# Patient Record
Sex: FEMALE | Employment: FULL TIME | URBAN - METROPOLITAN AREA
[De-identification: names, ages, dates, MRNs, and addresses within clinical notes are randomized per-mention and may not be internally consistent; named-entity substitution may affect disease eponyms.]

---

## 2017-02-10 DIAGNOSIS — Z00.00 HEALTHCARE MAINTENANCE: ICD-10-CM

## 2017-02-10 DIAGNOSIS — E55.9 HYPOVITAMINOSIS D: Primary | ICD-10-CM

## 2017-02-10 RX ORDER — CHOLECALCIFEROL (VITAMIN D3) 50 MCG
2000 TABLET ORAL DAILY
Qty: 100 TABLET | Refills: 3 | Status: SHIPPED
Start: 2017-02-10 | End: 2018-05-01

## 2017-02-10 NOTE — TELEPHONE ENCOUNTER
Request for medication refill:    Date of last visit at clinic: 8-23-16    Please complete refill if appropriate and CLOSE ENCOUNTER.    Closing the encounter signifies the refill is complete.    If refill has been denied, please complete the smart phrase .smirefuse and route it to the [facility] RN TRIAGE pool to inform the patient and the pharmacy.    Elen Alvarez

## 2017-07-07 ENCOUNTER — OFFICE VISIT (OUTPATIENT)
Dept: FAMILY MEDICINE | Facility: CLINIC | Age: 47
End: 2017-07-07

## 2017-07-07 VITALS
SYSTOLIC BLOOD PRESSURE: 113 MMHG | OXYGEN SATURATION: 98 % | DIASTOLIC BLOOD PRESSURE: 77 MMHG | RESPIRATION RATE: 16 BRPM | BODY MASS INDEX: 25.4 KG/M2 | WEIGHT: 156.2 LBS | HEART RATE: 64 BPM | TEMPERATURE: 98.1 F

## 2017-07-07 DIAGNOSIS — Z31.9 DESIRE FOR PREGNANCY: Primary | ICD-10-CM

## 2017-07-07 DIAGNOSIS — Z00.00 ROUTINE GENERAL MEDICAL EXAMINATION AT A HEALTH CARE FACILITY: ICD-10-CM

## 2017-07-07 LAB
ALBUMIN SERPL-MCNC: 4.1 MG/DL (ref 3.8–5)
ALP SERPL-CCNC: 58.3 U/L (ref 31.7–110.5)
ALT SERPL-CCNC: 12.2 U/L (ref 0–45)
AST SERPL-CCNC: 14.4 U/L (ref 0–45)
BILIRUB SERPL-MCNC: <0.4 MG/DL (ref 0.2–1.3)
BUN SERPL-MCNC: 8.8 MG/DL (ref 7–19)
CALCIUM SERPL-MCNC: 9.4 MG/DL (ref 8.5–10.1)
CHLORIDE SERPLBLD-SCNC: 101.8 MMOL/L (ref 98–110)
CO2 SERPL-SCNC: 22.3 MMOL/L (ref 20–32)
CREAT SERPL-MCNC: 0.7 MG/DL (ref 0.5–1)
GFR SERPL CREATININE-BSD FRML MDRD: >90 ML/MIN/1.7 M2
GLUCOSE SERPL-MCNC: 134 MG'DL (ref 70–99)
HCT VFR BLD AUTO: 39.7 % (ref 35–47)
HEMOGLOBIN: 12.8 G/DL (ref 11.7–15.7)
MCH RBC QN AUTO: 32.2 PG (ref 26.5–35)
MCHC RBC AUTO-ENTMCNC: 32.2 G/DL (ref 32–36)
MCV RBC AUTO: 99.9 FL (ref 78–100)
PLATELET # BLD AUTO: 162 K/UL (ref 150–450)
POTASSIUM SERPL-SCNC: 3.6 MMOL/DL (ref 3.3–4.5)
PROT SERPL-MCNC: 6.9 G/DL (ref 6.8–8.8)
RBC # BLD AUTO: 3.97 M/UL (ref 3.8–5.2)
SODIUM SERPL-SCNC: 133.4 MMOL/L (ref 132.6–141.4)
WBC # BLD AUTO: 4.9 K/UL (ref 4–11)

## 2017-07-07 RX ORDER — PRENATAL VIT/IRON FUM/FOLIC AC 27MG-0.8MG
1 TABLET ORAL DAILY
Qty: 100 TABLET | Refills: 3 | COMMUNITY
Start: 2017-07-07 | End: 2018-05-01

## 2017-07-07 NOTE — PROGRESS NOTES
Preceptor Attestation:   Patient seen and discussed with the resident. Assessment and plan reviewed with resident and agreed upon.   Supervising Physician:  Edmond Jeffery MD  [facility]

## 2017-07-07 NOTE — MR AVS SNAPSHOT
After Visit Summary   2017    Theresa Bowers    MRN: [id number]           Patient Information     Date Of Birth          1970        Visit Information        Provider Department      2017 2:20 PM Ashley Tyler MD [facility]        Today's Diagnoses     Desire for pregnancy    -  1       Follow-ups after your visit        Who to contact     Please call your clinic at [phone] to:    Ask questions about your health    Make or cancel appointments    Discuss your medicines    Learn about your test results    Speak to your doctor   If you have compliments or concerns about an experience at your clinic, or if you wish to file a complaint, please contact [facility] Patient Relations at [phone] or email us at [email or web address]         Additional Information About Your Visit        MyChart Information     GeoPay is an electronic gateway that provides easy, online access to your medical records. With GeoPay, you can request a clinic appointment, read your test results, renew a prescription or communicate with your care team.     To sign up for Voter Gravityt visit the website at [email or web address]   You will be asked to enter the access code listed below, as well as some personal information. Please follow the directions to create your username and password.     Your access code is: [email or web address]  Expires: 10/5/2017  3:08 PM     Your access code will  in 90 days. If you need help or a new code, please contact your [facility] or call [phone] for assistance.        Care EveryWhere ID     This is your Care EveryWhere ID. This could be used by other organizations to access your [facility] medical records  [id number]        Your Vitals Were     Pulse Temperature Respirations Pulse Oximetry BMI (Body Mass Index)       64 98.1  F (36.7  C) (Oral) 16 98% 25.4 kg/m2        Blood Pressure  from Last 3 Encounters:   07/07/17 113/77   08/23/16 118/81   10/15/15 114/72    Weight from Last 3 Encounters:   07/07/17 156 lb 3.2 oz (70.9 kg)   08/23/16 156 lb 12.8 oz (71.1 kg)   10/15/15 151 lb (68.5 kg)              We Performed the Following     CBC with Plt (LabDAQ)     Comprehensive Metabolic Panel (LabDAQ)     TSH with free T4 reflex     Vitamin D Level        Primary Care Provider Office Phone # Fax #    Ashley Tyler [phone]       [facility] [address] [facility] [address]        Equal Access to Services     SAVANNA ELLISON : Yaneth Chilel, dana cardona, akash ruano, giuliana kevin. So [facility] [phone].    ATENCIÓN: Si habla español, tiene a blanchard disposición servicios gratuitos de asistencia lingüística. Llame al [phone].    We comply with applicable federal civil rights laws and [place] laws. We do not discriminate on the basis of race, color, national origin, age, disability sex, sexual orientation or gender identity.            Thank you!     Thank you for choosing [facility]  for your care. Our goal is always to provide you with excellent care. Hearing back from our patients is one way we can continue to improve our services. Please take a few minutes to complete the written survey that you may receive in the mail after your visit with us. Thank you!             Your Updated Medication List - Protect others around you: Learn how to safely use, store and throw away your medicines at www.disposemymeds.org.          This list is accurate as of: 7/7/17  3:08 PM.  Always use your most recent med list.                   Brand Name Dispense Instructions for use Diagnosis    calcium carbonate-vitamin D 500-400 MG-UNIT Tabs per tablet     180 tablet    Take 1 tablet by mouth 2 times daily    Healthcare maintenance       guaiFENesin-dextromethorphan 100-10 MG/5ML syrup     ROBITUSSIN DM    560 mL    Take 5 mLs by mouth every 4 hours as needed for cough    URI (upper respiratory infection)       ibuprofen 600 MG tablet    ADVIL/MOTRIN    60 tablet    Take 1 tablet (600 mg) by mouth every 6 hours as needed for pain    Mechanical low back pain       loratadine 10 MG tablet    CLARITIN    30 tablet    Take 1 tablet (10 mg) by mouth daily    Seasonal allergies       prenatal multivitamin  plus iron 27-0.8 MG Tabs per tablet     100 tablet    Take 1 tablet by mouth daily    Desire for pregnancy       vitamin D 2000 UNITS tablet     100 tablet    Take 2,000 Units by mouth daily    Hypovitaminosis D

## 2017-07-07 NOTE — LETTER
July 10, 2017      Theresa Bowers  [address]   [place] [address]        Dear Theresa,    Thank you for getting your care at [facility]. Please see below for your test results. Your thyroid hormone is slightly elevated this time so I would like to retest in 3 months. Please call the clinic and schedule an appointment.     Resulted Orders   CBC with Plt (LabDAQ)   Result Value Ref Range    WBC 4.9 4.0 - 11.0 K/uL    RBC 3.97 3.80 - 5.20 M/uL    Hemoglobin 12.8 11.7 - 15.7 g/dL    Hematocrit 39.7 35.0 - 47.0 %    MCV 99.9 78.0 - 100.0 fL    MCH 32.2 26.5 - 35.0 pg    MCHC 32.2 32.0 - 36.0 g/dL    Platelets 162.0 150.0 - 450.0 K/uL   Comprehensive Metabolic Panel (LabDAQ)   Result Value Ref Range    Albumin 4.1 3.8 - 5.0 mg/dL    Alkaline Phosphatase 58.3 31.7 - 110.5 U/L    ALT 12.2 0.0 - 45.0 U/L    AST 14.4 0.0 - 45.0 U/L    Bilirubin Total <0.4 0.2 - 1.3 mg/dL    Urea Nitrogen 8.8 7.0 - 19.0 mg/dL    Calcium 9.4 8.5 - 10.1 mg/dL    Chloride 101.8 98.0 - 110.0 mmol/L    Carbon Dioxide 22.3 20.0 - 32.0 mmol/L    Creatinine 0.7 0.5 - 1.0 mg/dL    Glucose 134.0 (H) 70.0 - 99.0 mg'dL    Potassium 3.6 3.3 - 4.5 mmol/dL    Sodium 133.4 132.6 - 141.4 mmol/L    Protein Total 6.9 6.8 - 8.8 g/dL    GFR Estimate >90 >60.0 mL/min/1.7 m2    GFR Estimate If Black >90 >60.0 mL/min/1.7 m2   Vitamin D Level   Result Value Ref Range    Vitamin D Deficiency screening 37 20 - 75 ug/L      Comment:      Season, race, dietary intake, and treatment affect the concentration of   25-hydroxy-Vitamin D. Values may decrease during winter months and increase   during summer months. Values 20-29 ug/L may indicate Vitamin D insufficiency   and values <20 ug/L may indicate Vitamin D deficiency.   Vitamin D determination is routinely performed by an immunoassay specific for   25 hydroxyvitamin D3.  If an individual is on vitamin D2 (ergocalciferol)   supplementation, please specify 25 OH vitamin D2 and D3 level determination   by    LCMSMS test VITD23.     TSH with free T4 reflex   Result Value Ref Range    TSH 4.72 (H) 0.40 - 4.00 mU/L   T4 free   Result Value Ref Range    T4 Free 0.85 0.76 - 1.46 ng/dL       If you have any concerns about these results please call and leave a message for me or send a MyChart message to the clinic.    Sincerely,    Ashley Tyler MD

## 2017-07-08 LAB — DEPRECATED CALCIDIOL+CALCIFEROL SERPL-MC: 37 UG/L (ref 20–75)

## 2017-07-09 LAB
T4 FREE SERPL-MCNC: 0.85 NG/DL (ref 0.76–1.46)
TSH SERPL DL<=0.005 MIU/L-ACNC: 4.72 MU/L (ref 0.4–4)

## 2017-07-12 PROBLEM — Z31.9 DESIRE FOR PREGNANCY: Status: ACTIVE | Noted: 2017-07-12

## 2017-07-13 NOTE — PROGRESS NOTES
Female Physical Note          HPI       Concerns today: No special concerns today.     Patient wants to check her overall health. She states that her real age is 41 and that she desires another child. She is planning to go to [facility] in August to meet her  and hopefully to get pregnant. She states that her menstrual period is regular at this point (h/o irregular menses in the past).      Patient Active Problem List   Diagnosis     PPD positive, treated - 9 mos INH completed, given by [facility] in 2004.     Age reporting       History reviewed. No pertinent past medical history.    Previous Medical Care   Here at [facility]     Family History   Problem Relation Age of Onset     GASTROINTESTINAL DISEASE Brother      GERD              Review of Systems:     Review of Systems:  CONSTITUTIONAL: NEGATIVE for fever, chills, change in weight  INTEGUMENTARY/SKIN: NEGATIVE for worrisome rashes, moles or lesions  EYES: NEGATIVE for vision changes or irritation  ENT/MOUTH: NEGATIVE for ear, mouth and throat problems  RESP: NEGATIVE for significant cough or SOB  BREAST: NEGATIVE for masses, tenderness or discharge  CV: NEGATIVE for chest pain, palpitations or peripheral edema  GI: NEGATIVE for nausea, abdominal pain, heartburn, or change in bowel habits  : NEGATIVE for frequency, dysuria, or hematuria  MUSCULOSKELETAL: NEGATIVE for significant arthralgias or myalgia  NEURO: NEGATIVE for weakness, dizziness or paresthesias  ENDOCRINE: NEGATIVE for temperature intolerance, skin/hair changes  HEME/ALLERGY: NEGATIVE for bleeding problems  PSYCHIATRIC: NEGATIVE for changes in mood or affect  Sleep:   Do you snore most or the night (as reported by a family member)? No  Do you feel sleepy or extremely tired during most of the day? No             Social History     Social History     Social History     Marital status: Single     Spouse name: N/A     Number of children: N/A     Years of education: N/A     Occupational History     Not  on file.     Social History Main Topics     Smoking status: Never Smoker     Smokeless tobacco: Not on file     Alcohol use No     Drug use: No     Sexual activity: Not on file     Other Topics Concern     Not on file     Social History Narrative       Marital Status:  Who lives in your household? With her sons.  lives in [place]    Has anyone hurt you physically, for example by pushing, hitting, slapping or kicking you or forcing you to have sex? Denies  Do you feel threatened or controlled by a partner, ex-partner or anyone in your life? Denies    Sexual Health     Sexual concerns: No   STI History: Neg  Pregnancy History: No obstetric history on file.  LMP No LMP recorded. Menses: q 28-31 days  Last Pap Smear Date:   Lab Results   Component Value Date    PAP NIL 09/26/2014    PAP NIL 10/18/2011     Abnormal Pap History: None    Recommended Screening     Pap/HPV cotest every 5 years for women 30-65   Recommended and patient accepted testing:  Co-test performed in 2014 so due for a CO-TEST in 2019.            Physical Exam:     Vitals: /77  Pulse 64  Temp 98.1  F (36.7  C) (Oral)  Resp 16  Wt 156 lb 3.2 oz (70.9 kg)  SpO2 98%  BMI 25.4 kg/m2  BMI= Body mass index is 25.4 kg/(m^2).   GENERAL: healthy, alert and no distress  EYES: Eyes grossly normal to inspection, extraocular movements - intact, and PERRL  HENT: ear canals- normal; TMs- normal; Nose- normal; Mouth- no ulcers, no lesions  NECK: no tenderness, no adenopathy, no asymmetry, no masses, no stiffness; thyroid- normal to palpation  RESP: lungs clear to auscultation - no rales, no rhonchi, no wheezes  BREAST: deferred  CV: regular rates and rhythm, normal S1 S2, no S3 or S4 and no murmur, no click or rub -  ABDOMEN: soft, no tenderness, no  hepatosplenomegaly, no masses, normal bowel sounds  MS: extremities- no gross deformities noted, no edema  SKIN: no suspicious lesions, no rashes  NEURO: strength and tone- normal, sensory exam-  grossly normal, mentation- intact, speech- normal, reflexes- symmetric  BACK: no CVA tenderness, no paralumbar tenderness  - female: deferred  PSYCH: Alert and oriented times 3; speech- coherent , normal rate and volume; able to articulate logical thoughts, able to abstract reason, no tangential thoughts, no hallucinations or delusions, affect- normal  LYMPHATICS: ant. cervical- normal, post. cervical- normal, axillary- normal, supraclavicular- normal, inguinal- normal      Assessment and Plan   Theresa was seen today for physical.    Diagnoses and all orders for this visit:    Desire for pregnancy  -     CBC with Plt (LabDAQ)  -     Comprehensive Metabolic Panel (LabDAQ)  -     Vitamin D Level  -     TSH with free T4 reflex  -     Discussed core temperature measurement for ovulation recognition. Also advised to buy an ovulation kit to better know ovulation days.     Follow up in 1 year for CPE.     Options for treatment and follow-up care were reviewed with the patient . Theresa Bowers and/or guardian engaged in the decision making process and verbalized understanding of the options discussed and agreed with the final plan.    Ashley Tyler MD

## 2018-05-01 DIAGNOSIS — Z31.9 DESIRE FOR PREGNANCY: ICD-10-CM

## 2018-05-01 DIAGNOSIS — Z00.00 HEALTHCARE MAINTENANCE: ICD-10-CM

## 2018-05-01 DIAGNOSIS — E55.9 HYPOVITAMINOSIS D: ICD-10-CM

## 2018-05-01 RX ORDER — CHOLECALCIFEROL (VITAMIN D3) 50 MCG
2000 TABLET ORAL DAILY
Qty: 100 TABLET | Refills: 3 | Status: SHIPPED | OUTPATIENT
Start: 2018-05-01 | End: 2019-03-05

## 2018-05-01 RX ORDER — PRENATAL VIT/IRON FUM/FOLIC AC 27MG-0.8MG
1 TABLET ORAL DAILY
Qty: 100 TABLET | Refills: 3 | Status: SHIPPED | OUTPATIENT
Start: 2018-05-01 | End: 2020-08-14

## 2018-05-25 ENCOUNTER — OFFICE VISIT (OUTPATIENT)
Dept: FAMILY MEDICINE | Facility: CLINIC | Age: 48
End: 2018-05-25
Payer: COMMERCIAL

## 2018-05-25 VITALS
WEIGHT: 163.4 LBS | TEMPERATURE: 98.1 F | HEART RATE: 65 BPM | HEIGHT: 66 IN | RESPIRATION RATE: 16 BRPM | DIASTOLIC BLOOD PRESSURE: 74 MMHG | SYSTOLIC BLOOD PRESSURE: 108 MMHG | OXYGEN SATURATION: 100 % | BODY MASS INDEX: 26.26 KG/M2

## 2018-05-25 DIAGNOSIS — Z31.9 DESIRE FOR PREGNANCY: ICD-10-CM

## 2018-05-25 DIAGNOSIS — R79.89 ELEVATED TSH: ICD-10-CM

## 2018-05-25 DIAGNOSIS — Z00.00 ROUTINE GENERAL MEDICAL EXAMINATION AT A HEALTH CARE FACILITY: Primary | ICD-10-CM

## 2018-05-25 LAB
T4 FREE SERPL-MCNC: 0.81 NG/DL (ref 0.76–1.46)
TSH SERPL DL<=0.005 MIU/L-ACNC: 4.78 MU/L (ref 0.4–4)

## 2018-05-25 NOTE — PROGRESS NOTES
Female Physical Note          HPI       Concerns today: patient wants to get pregnant. She reports having regular periods. Her  lives in [place] and she is visiting him time to time. She takes prenatal vitamins. She reports that her real age is 43 yo.   She has h/o elevated TSH at 4.72 and normal fT4 in 2017.   Patient Active Problem List   Diagnosis     PPD positive, treated - 9 mos INH [place] [facility] [place] [facility] [place]     Age reporting     Desire for pregnancy       History reviewed. No pertinent past medical history.    Previous Medical Care   [facility]     Family History   Problem Relation Age of Onset     GASTROINTESTINAL DISEASE Brother      GERD            Review of Systems:     Review of Systems:  CONSTITUTIONAL: NEGATIVE for fever, chills, change in weight  INTEGUMENTARY/SKIN: NEGATIVE for worrisome rashes, moles or lesions  EYES: NEGATIVE for vision changes or irritation  ENT/MOUTH: NEGATIVE for ear, mouth and throat problems  RESP: NEGATIVE for significant cough or SOB  BREAST: NEGATIVE for masses, tenderness or discharge  CV: NEGATIVE for chest pain, palpitations or peripheral edema  GI: NEGATIVE for nausea, abdominal pain, heartburn, or change in bowel habits  : NEGATIVE for frequency, dysuria, or hematuria  MUSCULOSKELETAL: NEGATIVE for significant arthralgias or myalgia  NEURO: NEGATIVE for weakness, dizziness or paresthesias  ENDOCRINE: NEGATIVE for temperature intolerance, skin/hair changes  HEME/ALLERGY: NEGATIVE for bleeding problems  PSYCHIATRIC: NEGATIVE for changes in mood or affect    Sleep:   Do you snore most or the night (as reported by a family member)? No  Do you feel sleepy or extremely tired during most of the day? No         Social History     Social History     Social History     Marital status: Single     Spouse name: N/A     Number of children: N/A     Years of education: N/A     Occupational History     Not on file.     Social History Main Topics     Smoking  "status: Never Smoker     Smokeless tobacco: Never Used     Alcohol use No     Drug use: No     Sexual activity: Not on file     Other Topics Concern     Not on file     Social History Narrative       Marital Status:,  in [place]  Who lives in your household? Lives with her children: 17, 18 and has another son (already )    Has anyone hurt you physically, for example by pushing, hitting, slapping or kicking you or forcing you to have sex? Denies  Do you feel threatened or controlled by a partner, ex-partner or anyone in your life? Denies    Sexual Health     Sexual concerns: No   STI History: Neg  Pregnancy History: No obstetric history on file.  LMP Patient's last menstrual period was 05/10/2018 (approximate). Menses: q 28-30 days  Last Pap Smear Date:   Lab Results   Component Value Date    PAP NIL 09/26/2014    PAP NIL 10/18/2011     Abnormal Pap History: None    Recommended Screening   None at this point.         Physical Exam:     Vitals: /74  Pulse 65  Temp 98.1  F (36.7  C) (Oral)  Resp 16  Ht 5' 5.75\" (167 cm)  Wt 163 lb 6.4 oz (74.1 kg)  LMP 05/10/2018 (Approximate)  SpO2 100%  Breastfeeding? No  BMI 26.57 kg/m2  BMI= Body mass index is 26.57 kg/(m^2).   GENERAL: healthy, alert and no distress  EYES: Eyes grossly normal to inspection, extraocular movements - intact, and PERRL  HENT: ear canals- normal; TMs- normal; Nose- normal; Mouth- no ulcers, no lesions  NECK: no tenderness, no adenopathy, no asymmetry, no masses, no stiffness; thyroid- normal to palpation  RESP: lungs clear to auscultation - no rales, no rhonchi, no wheezes  BREAST: no masses, no tenderness, no nipple discharge, no palpable axillary masses or adenopathy  CV: regular rates and rhythm, normal S1 S2, no S3 or S4 and no murmur, no click or rub -  ABDOMEN: soft, no tenderness, no  hepatosplenomegaly, no masses, normal bowel sounds  MS: extremities- no gross deformities noted, no edema  SKIN: no suspicious " lesions, no rashes  NEURO: strength and tone- normal, sensory exam- grossly normal, mentation- intact, speech- normal, reflexes- symmetric  BACK: no CVA tenderness, no paralumbar tenderness  : deferred today  PSYCH: Alert and oriented times 3; speech- coherent , normal rate and volume; able to articulate logical thoughts, able to abstract reason, no tangential thoughts, no hallucinations or delusions, affect- normal  LYMPHATICS: ant. cervical- normal, post. cervical- normal, axillary- normal, supraclavicular- normal, inguinal- normal      Assessment and Plan      Theresa was seen today for physical.    Diagnoses and all orders for this visit:    Routine general medical examination at a health care facility      # Health Care Maintenance: Normal Physical Exam  Plan:  1. Routine follow up in one year.  2. Contraception: not needed, desires pregnancy. Discussed that with her age it could take longer to get pregnant. Recommended to continue with prenatal vitamins.   3. Mildly elevated TSH one year ago - will recheck today.     Options for treatment and follow-up care were reviewed with the patient . Theresa Bowers and/or guardian engaged in the decision making process and verbalized understanding of the options discussed and agreed with the final plan.    Ashley Tyler MD, PhD  [facility],  PGY-3 Family Medicine Resident  #[id number]

## 2018-05-25 NOTE — LETTER
May 29, 2018      Theresa Bowers  [address]   [place] [address]        Dear Theresa,    Thank you for getting your care at [facility]. Please see below for your test results.  Your results are the same as before so no treatment is needed. We will continue to monitor and recommend to repeat test in 6 months.   Resulted Orders   TSH with free T4 reflex   Result Value Ref Range    TSH 4.78 (H) 0.40 - 4.00 mU/L   T4 free   Result Value Ref Range    T4 Free 0.81 0.76 - 1.46 ng/dL       If you have any concerns about these results please call and leave a message for me or send a Cap That message to the clinic.    Sincerely,    Ashley Tyler MD

## 2018-05-25 NOTE — MR AVS SNAPSHOT
After Visit Summary   5/25/2018    Theresa Bowers    MRN: [id number]           Patient Information     Date Of Birth          1970        Visit Information        Provider Department      5/25/2018 1:20 PM Ashley Tyler MD [facility]        Today's Diagnoses     Routine general medical examination at a health care facility    -  1    Elevated TSH        Desire for pregnancy          Care Instructions      Preventive Health Recommendations  Female Ages 40 to 49    Yearly exam:     See your health care provider every year in order to  1. Review health changes.   2. Discuss preventive care.    3. Review your medicines if your doctor prescribed any.      Get a Pap test every three years (unless you have an abnormal result and your provider advises testing more often).      If you get Pap tests with HPV test, you only need to test every 5 years, unless you have an abnormal result. You do not need a Pap test if your uterus was removed (hysterectomy) and you have not had cancer.      You should be tested each year for STDs (sexually transmitted diseases), if you're at risk.       Ask your doctor if you should have a mammogram.      Have a colonoscopy (test for colon cancer) if someone in your family has had colon cancer or polyps before age 50.       Have a cholesterol test every 5 years.       Have a diabetes test (fasting glucose) after age 45. If you are at risk for diabetes, you should have this test every 3 years.    Shots: Get a flu shot each year. Get a tetanus shot every 10 years.     Nutrition:     Eat at least 5 servings of fruits and vegetables each day.    Eat whole-grain bread, whole-wheat pasta and brown rice instead of white grains and rice.    Talk to your provider about Calcium and Vitamin D.     Lifestyle    Exercise at least 150 minutes a week (an average of 30 minutes a day, 5 days a week). This will help you control your weight and prevent disease.    Limit  "alcohol to one drink per day.    No smoking.     Wear sunscreen to prevent skin cancer.    See your dentist every six months for an exam and cleaning.          Follow-ups after your visit        Follow-up notes from your care team     Return in about 1 year (around 2019) for Physical Exam.      Who to contact     Please call your clinic at [phone] to:    Ask questions about your health    Make or cancel appointments    Discuss your medicines    Learn about your test results    Speak to your doctor            Additional Information About Your Visit        [employer] Information     [employer] is an electronic gateway that provides easy, online access to your medical records. With [employer], you can request a clinic appointment, read your test results, renew a prescription or communicate with your care team.     To sign up for [employer] visit the website at [email or web address]   You will be asked to enter the access code listed below, as well as some personal information. Please follow the directions to create your username and password.     Your access code is: [email or web address]  Expires: 2018  1:19 PM     Your access code will  in 90 days. If you need help or a new code, please contact your [facility] or call [phone] for assistance.        Care EveryWhere ID     This is your Care EveryWhere ID. This could be used by other organizations to access your [facility] medical records  [id number]        Your Vitals Were     Pulse Temperature Respirations Height Last Period Pulse Oximetry    65 98.1  F (36.7  C) (Oral) 16 5' 5.75\" (167 cm) 05/10/2018 (Approximate) 100%    Breastfeeding? BMI (Body Mass Index)                No 26.57 kg/m2           Blood Pressure from Last 3 Encounters:   18 108/74   17 113/77   16 118/81    Weight from Last 3 Encounters:   18 163 lb 6.4 oz (74.1 kg)   17 156 lb 3.2 oz (70.9 kg)   16 156 lb 12.8 oz (71.1 kg) "              We Performed the Following     T4 free     TSH with free T4 reflex        Primary Care Provider Office Phone # Fax #    Ashley Tyler [phone]       [facility] [address] [facility] [address]        Equal Access to Services     SAVANNA ELLISON : Hadii aad ku hadbeckigeronimo Solior, waaxda luqadaha, qaybta kaalmada adeegyada, giuliana love hayterrie wooehmainor kevin. So [facility] [phone].    ATENCIÓN: Si habla español, tiene a blanchard disposición servicios gratuitos de asistencia lingüística. Llame al [phone].    We comply with applicable federal civil rights laws and [place] laws. We do not discriminate on the basis of race, color, national origin, age, disability, sex, sexual orientation, or gender identity.            Thank you!     Thank you for choosing [facility]  for your care. Our goal is always to provide you with excellent care. Hearing back from our patients is one way we can continue to improve our services. Please take a few minutes to complete the written survey that you may receive in the mail after your visit with us. Thank you!             Your Updated Medication List - Protect others around you: Learn how to safely use, store and throw away your medicines at www.disposemymeds.org.          This list is accurate as of 5/25/18 11:59 PM.  Always use your most recent med list.                   Brand Name Dispense Instructions for use Diagnosis    calcium carbonate-vitamin D 500-400 MG-UNIT Tabs per tablet     180 tablet    Take 1 tablet by mouth 2 times daily    Healthcare maintenance       prenatal multivitamin plus iron 27-0.8 MG Tabs per tablet     100 tablet    Take 1 tablet by mouth daily    Desire for pregnancy       vitamin D 2000 units tablet     100 tablet    Take 2,000 Units by mouth daily    Hypovitaminosis D

## 2018-05-29 NOTE — PROGRESS NOTES
Preceptor Attestation:   Patient seen, evaluated and discussed with the resident. I have verified the content of the note, which accurately reflects my assessment of the patient and the plan of care.   Supervising Physician:  Girma Simpson MD

## 2018-06-01 ENCOUNTER — DOCUMENTATION ONLY (OUTPATIENT)
Dept: FAMILY MEDICINE | Facility: CLINIC | Age: 48
End: 2018-06-01

## 2018-06-01 NOTE — PROGRESS NOTES
"When opening a documentation only encounter, be sure to enter in \"Chief Complaint\" Forms and in \" Comments\" Title of form, description if needed.    Theresa is a 48 year old  female  Form received via: Patient Drop Off  Form now resides in: Provider Ready      Form has been completed by provider.     Form sent out via: Mailed Cesarart Bowers at [address] \[facility]\"", [place] [address]  Patient informed: No  Output date: June 1, 2018    AGUSTIN Mosqueda 10:07 AM June 1, 2018    **Please close the encounter**      "

## 2019-03-05 ENCOUNTER — OFFICE VISIT (OUTPATIENT)
Dept: FAMILY MEDICINE | Facility: CLINIC | Age: 49
End: 2019-03-05
Payer: COMMERCIAL

## 2019-03-05 VITALS
OXYGEN SATURATION: 100 % | BODY MASS INDEX: 25.89 KG/M2 | TEMPERATURE: 98 F | DIASTOLIC BLOOD PRESSURE: 76 MMHG | WEIGHT: 159.2 LBS | SYSTOLIC BLOOD PRESSURE: 113 MMHG | HEART RATE: 64 BPM

## 2019-03-05 DIAGNOSIS — M25.512 ACUTE PAIN OF LEFT SHOULDER: ICD-10-CM

## 2019-03-05 DIAGNOSIS — E55.9 HYPOVITAMINOSIS D: ICD-10-CM

## 2019-03-05 DIAGNOSIS — E03.8 SUBCLINICAL HYPOTHYROIDISM: Primary | ICD-10-CM

## 2019-03-05 PROBLEM — E03.9 HYPOTHYROIDISM: Status: ACTIVE | Noted: 2018-08-15

## 2019-03-05 LAB — TSH SERPL DL<=0.005 MIU/L-ACNC: 3.16 MU/L (ref 0.4–4)

## 2019-03-05 RX ORDER — CHOLECALCIFEROL (VITAMIN D3) 50 MCG
2000 TABLET ORAL DAILY
Qty: 100 TABLET | Refills: 3 | Status: SHIPPED | OUTPATIENT
Start: 2019-03-05 | End: 2020-08-14

## 2019-03-05 RX ORDER — LEVOTHYROXINE SODIUM 50 UG/1
50 TABLET ORAL DAILY
COMMUNITY
Start: 2018-08-15 | End: 2020-08-18

## 2019-03-05 ASSESSMENT — ENCOUNTER SYMPTOMS
CONSTITUTIONAL NEGATIVE: 1
ARTHRALGIAS: 1
NAUSEA: 0
CONSTIPATION: 0
SHORTNESS OF BREATH: 0
PALPITATIONS: 0
NUMBNESS: 0
VOMITING: 0
DIARRHEA: 0
ABDOMINAL PAIN: 0
DYSPHORIC MOOD: 0
WEAKNESS: 0
HEADACHES: 0

## 2019-03-05 NOTE — LETTER
March 12, 2019      Theresa Bowers  [address]   [facility] [address]        Dear Theresa,    Thank you for getting your care at [facility]. Please see below for your test results. Your thyroid hormone blood test we did shows that it is normal. You may follow up in clinic to discuss this further and what the next steps in plan are.     Resulted Orders   TSH with free T4 reflex   Result Value Ref Range    TSH 3.16 0.40 - 4.00 mU/L         Sincerely,      Tyesha Dumont MD

## 2019-03-05 NOTE — PROGRESS NOTES
HPI       Theresa Bowers is a 49 year old  who presents for   Chief Complaint   Patient presents with     Thyroid Problem     check     Pain     left shoulder pain, 2 months, progressivly getting.     1) Thyroid: wants to have thyroid hormone level checked.  Patient has a history of subclinical hypothyroidism.  She reports she went to a [facility] clinic who started her on levothyroxine.  Per review of care everywhere patient had a TSH level checked on 8/15/2018 which was 11.12, free T4 0.9.  She was started on 50mcg levothyroxine daily.  She has been out of the medication for 2 months due to some issues with her insurance.  Therefore would like her level checked today and medication refill if appropriate.    2) left shoulder pain: Onset 2 months ago.  No known triggers or injuries at [facility].  Located in her left shoulder.  Has trouble lifting her left arm above her head, or behind her back.  Makes it difficult to dress herself and shower sometimes.  Has not done anything at home to help provide relief.     3) requesting refill of vitamin D supplement  +++++++    Problem, Medication and Allergy Lists were reviewed and updated if needed..    Patient is an established patient of this clinic..         Review of Systems:   Review of Systems   Constitutional: Negative.    Respiratory: Negative for shortness of breath.    Cardiovascular: Negative for chest pain and palpitations.   Gastrointestinal: Negative for abdominal pain, constipation, diarrhea, nausea and vomiting.   Musculoskeletal: Positive for arthralgias (left shoulder).   Skin: Negative.    Neurological: Negative for weakness, numbness and headaches.   Psychiatric/Behavioral: Negative for dysphoric mood.            Physical Exam:     Vitals:    03/05/19 1118   BP: 113/76   Pulse: 64   Temp: 98  F (36.7  C)   TempSrc: Oral   SpO2: 100%   Weight: 72.2 kg (159 lb 3.2 oz)     Body mass index is 25.89 kg/m .  Vitals were reviewed and were  normal     Physical Exam   Constitutional: She appears well-developed and well-nourished. No distress.   HENT:   Head: Normocephalic and atraumatic.   Neck: No thyromegaly present.   Cardiovascular: Normal rate, regular rhythm and normal heart sounds.   Pulmonary/Chest: Effort normal and breath sounds normal.   Musculoskeletal:        Right shoulder: Normal.   Unable to abduct left shoulder past 90 degrees. Limited internal rotation due to pain. No sensory deficits. Normal  strength. Tight over left trap muscle.    Neurological: She is alert.   Skin: Skin is warm and dry. She is not diaphoretic.   Psychiatric: She has a normal mood and affect. Her behavior is normal.       Results:   Results are ordered and pending    Assessment and Plan        1. Subclinical hypothyroidism  We will recheck TSH with T4 today.  Restart previous levothyroxine pending lab results.  - TSH with free T4 reflex    2. Hypovitaminosis D  Refilled patient's vitamin D supplement.  - vitamin D3 (CHOLECALCIFEROL) 2000 units tablet; Take 1 tablet by mouth daily  Dispense: 100 tablet; Refill: 3    3. Acute pain of left shoulder  Most likely due to rotator cuff injury/tendinitis.  Discussed management of pain with use of Tylenol or ibuprofen.  Offered physical therapy and patient is very interested in this.  Says physical therapy has worked for her in the past when managing her back pain.  Referral to PT ordered, patient will call to get appointment scheduled.  Follow-up after 4-6 weeks of physical therapy.  - [facility], PT, HAND AND CHIROPRACTIC REFERRAL - [facility]; Future       There are no discontinued medications.    Options for treatment and follow-up care were reviewed with the patient. Theresa Bowers  engaged in the decision making process and verbalized understanding of the options discussed and agreed with the final plan.    Julisa Dumont MD

## 2019-03-05 NOTE — PROGRESS NOTES
Preceptor Attestation:   Patient seen, evaluated and discussed with the resident. I have verified the content of the note, which accurately reflects my assessment of the patient and the plan of care.   Supervising Physician:  Leo West MD

## 2019-03-05 NOTE — PATIENT INSTRUCTIONS
Use physical therapy for 4-6 weeks for your left shoulder. If no improvement after that time, come back to clinic. You can also use ibuprofen or tylenol for pain control.    We'll check your thyroid hormone today.

## 2019-03-13 ENCOUNTER — THERAPY VISIT (OUTPATIENT)
Dept: PHYSICAL THERAPY | Facility: CLINIC | Age: 49
End: 2019-03-13
Payer: COMMERCIAL

## 2019-03-13 DIAGNOSIS — M25.512 ACUTE PAIN OF LEFT SHOULDER: ICD-10-CM

## 2019-03-13 DIAGNOSIS — M25.512 SHOULDER PAIN, LEFT: ICD-10-CM

## 2019-03-13 PROCEDURE — 97161 PT EVAL LOW COMPLEX 20 MIN: CPT | Mod: GP | Performed by: PHYSICAL THERAPIST

## 2019-03-13 PROCEDURE — 97112 NEUROMUSCULAR REEDUCATION: CPT | Mod: GP | Performed by: PHYSICAL THERAPIST

## 2019-03-13 NOTE — PROGRESS NOTES
[facility] Initial Evaluation  Subjective:    Theresa Bowers is a 49 year old female with a left shoulder condition.  Condition occurred with:  Unknown cause.  Condition occurred: for unknown reasons.  This is a new condition  L shoulder pain.    Patient reports pain:  Lateral.  Radiates to: denies.  Pain is described as aching and is intermittent and reported as 3/10.  Associated symptoms:  Loss of strength and loss of motion/stiffness. Pain is worse during the day.  Symptoms are exacerbated by lifting, using arm at shoulder level, lying on extremity, carrying and certain positions and relieved by rest.  Since onset symptoms are unchanged.  Special testing: none reported.      General health as reported by patient is excellent.  Pertinent medical history includes:  None.  Medical allergies: no.  Other surgeries include:  None reported.  Current medications:  None as reported by the patient.  Current occupation is CNA.  Patient is working in normal job without restrictions.  Primary job tasks include:  Lifting and prolonged standing.    Barriers include:  None as reported by the patient.    Red flags:  None as reported by the patient.                        Objective:  System                   Shoulder Evaluation:  ROM:  AROM:    Flexion:  Left:  160    Right:  WNL    Abduction:  Left: 90 painful    Right:  WNL    Internal Rotation:  Left:  WNL    Right:  WNL  External Rotation:  Left:  WNL    Right:  WNL                      Strength:    Flexion: Left:5-/5   Pain:    Right: 5-/5     Pain:   Extension:  Left: 5-/5    Pain:    Right: 5-/5    Pain:  Abduction:  Left: 5-/5  Pain:    Right: 5-/5     Pain:    Internal Rotation:  Left:5-/5     Pain:    Right: 5-/5     Pain:  External Rotation:   Left:4+/5     Pain:   Right:4+/5     Pain:        Elbow Flexion:  Left:5-/5     Pain:    Right:5-/5     Pain:  Elbow Extension:  Left:5-/5     Pain:    Right:5-/5     Pain:    Special Tests:    Left shoulder  positive for the following special tests:  Impingement    Right shoulder negative for the following special tests:Impingement  Palpation:  not assessed                                         General     ROS    Assessment/Plan:    Patient is a 49 year old female with left side shoulder complaints.    Patient has the following significant findings with corresponding treatment plan.                Diagnosis 1:  L shoulder pain  Pain -  manual therapy, splint/taping/bracing/orthotics, self management, education and home program  Decreased ROM/flexibility - manual therapy and therapeutic exercise  Decreased joint mobility - manual therapy and therapeutic exercise  Decreased strength - therapeutic exercise and therapeutic activities  Decreased proprioception - neuro re-education and therapeutic activities    Therapy Evaluation Codes:   1) History comprised of:   Personal factors that impact the plan of care:      None.    Comorbidity factors that impact the plan of care are:      None.     Medications impacting care: None.  2) Examination of Body Systems comprised of:   Body structures and functions that impact the plan of care:      Shoulder.   Activity limitations that impact the plan of care are:      Dressing, Lifting, Working, Sleeping and Laying down.  3) Clinical presentation characteristics are:   Stable/Uncomplicated.  4) Decision-Making    Low complexity using standardized patient assessment instrument and/or measureable assessment of functional outcome.  Cumulative Therapy Evaluation is: Low complexity.    Previous and current functional limitations:  (See Goal Flow Sheet for this information)    Short term and Long term goals: (See Goal Flow Sheet for this information)     Communication ability:  Patient appears to be able to clearly communicate and understand verbal and written communication and follow directions correctly.  Treatment Explanation - The following has been discussed with the patient:   RX  ordered/plan of care  Anticipated outcomes  Possible risks and side effects  This patient would benefit from PT intervention to resume normal activities.   Rehab potential is good.    Frequency:  1 X week, once daily  Duration:  for 8 weeks  Discharge Plan:  Achieve all LTG.  Independent in home treatment program.  Reach maximal therapeutic benefit.    Please refer to the daily flowsheet for treatment today, total treatment time and time spent performing 1:1 timed codes.

## 2019-04-30 ENCOUNTER — THERAPY VISIT (OUTPATIENT)
Dept: PHYSICAL THERAPY | Facility: CLINIC | Age: 49
End: 2019-04-30
Payer: COMMERCIAL

## 2019-04-30 DIAGNOSIS — M25.512 SHOULDER PAIN, LEFT: ICD-10-CM

## 2019-04-30 PROCEDURE — 97112 NEUROMUSCULAR REEDUCATION: CPT | Mod: GP

## 2019-04-30 PROCEDURE — 97110 THERAPEUTIC EXERCISES: CPT | Mod: GP

## 2019-05-21 ENCOUNTER — OFFICE VISIT (OUTPATIENT)
Dept: FAMILY MEDICINE | Facility: CLINIC | Age: 49
End: 2019-05-21
Payer: COMMERCIAL

## 2019-05-21 ENCOUNTER — ANCILLARY PROCEDURE (OUTPATIENT)
Dept: GENERAL RADIOLOGY | Facility: CLINIC | Age: 49
End: 2019-05-21
Attending: FAMILY MEDICINE
Payer: COMMERCIAL

## 2019-05-21 VITALS
HEIGHT: 66 IN | RESPIRATION RATE: 16 BRPM | HEART RATE: 62 BPM | DIASTOLIC BLOOD PRESSURE: 79 MMHG | WEIGHT: 154.6 LBS | BODY MASS INDEX: 24.85 KG/M2 | SYSTOLIC BLOOD PRESSURE: 117 MMHG | TEMPERATURE: 97.9 F | OXYGEN SATURATION: 98 %

## 2019-05-21 DIAGNOSIS — E55.9 HYPOVITAMINOSIS D: ICD-10-CM

## 2019-05-21 DIAGNOSIS — M25.512 LEFT SHOULDER PAIN, UNSPECIFIED CHRONICITY: ICD-10-CM

## 2019-05-21 DIAGNOSIS — Z00.00 HEALTHCARE MAINTENANCE: ICD-10-CM

## 2019-05-21 DIAGNOSIS — M25.512 LEFT SHOULDER PAIN, UNSPECIFIED CHRONICITY: Primary | ICD-10-CM

## 2019-05-21 ASSESSMENT — MIFFLIN-ST. JEOR: SCORE: 1338.01

## 2019-05-21 NOTE — PROGRESS NOTES
"       HPI       Theresa Bowers is a 49 year old  who presents for   Chief Complaint   Patient presents with     Shoulder     shoulder pain, about a while      Patient has had left shoulder pain for about 4 months.  She was seen by Dr. Dumont who felt her pain was due to partial rotator cuff tear and was sent to physical therapy with plan to follow-up if no improvement.  She did go to two physical therapy appointments, but did not do her exercises at home outside of this.  Her pain is similar to how it was at last visit, and is located over the anterio-lateral aspect of her shoulder. She does not remember any trauma to the shoulder.    +++++++    Problem, Medication and Allergy Lists were reviewed and updated if needed..    Patient is an established patient of this clinic..         Review of Systems:   Review of Systems   Constitutional: Negative for chills and fever.   Respiratory: Negative for cough and shortness of breath.    Cardiovascular: Negative for chest pain.   Gastrointestinal: Negative for abdominal pain.   Musculoskeletal: Positive for arthralgias. Negative for joint swelling and neck pain.   Neurological: Negative for weakness, numbness and headaches.            Physical Exam:     Vitals:    05/21/19 1135   BP: 117/79   Pulse: 62   Resp: 16   Temp: 97.9  F (36.6  C)   TempSrc: Oral   SpO2: 98%   Weight: 70.1 kg (154 lb 9.6 oz)   Height: 1.676 m (5' 6\")     Body mass index is 24.95 kg/m .  Vitals were reviewed and were normal     Physical Exam   Constitutional: She is oriented to person, place, and time. She appears well-developed and well-nourished. No distress.   HENT:   Head: Normocephalic and atraumatic.   Eyes: Conjunctivae are normal. No scleral icterus.   Cardiovascular: Intact distal pulses.   Pulmonary/Chest: Effort normal. No respiratory distress.   Musculoskeletal: She exhibits no edema.   Neurological: She is alert and oriented to person, place, and time.   Skin: Skin is warm and dry. No " erythema.   Psychiatric: She has a normal mood and affect.   Right Shoulder Exam   Right shoulder exam is normal.      Left Shoulder Exam     Tenderness   The patient is experiencing no tenderness.     Range of Motion   Active abduction:  130 abnormal   Passive abduction: normal   Internal rotation 0 degrees: normal   Internal rotation 90 degrees: normal     Muscle Strength   Abduction: 5/5   Internal rotation: 5/5   External rotation: 4/5   Supraspinatus: 5/5   Subscapularis: 5/5   Biceps: 5/5     Tests   Calvo test: negative  Impingement: negative  Sulcus: absent            Results:      Results from this visit  Results for orders placed or performed in visit on 03/05/19   TSH with free T4 reflex   Result Value Ref Range    TSH 3.16 0.40 - 4.00 mU/L       Assessment and Plan        Theresa was seen today for shoulder.    Diagnoses and all orders for this visit:    Left shoulder pain, unspecified chronicity  Based on my exam, I would agree that she likely has a partial rotator cuff tear and will need further physical therapy as I do not think she has had sufficient therapy. Will have her continue therapy and get an X-ray today to r/o any bony abnormalities. If no improvement after 2-4 weeks, would get MRI and have patient see Sports Med.  -     X-ray lt Shoulder G/E 3 vw; Future  -     diclofenac (VOLTAREN) 1 % topical gel; Place 2 g onto the skin 4 times daily    Hypovitaminosis D  -     vitamin D3 (CHOLECALCIFEROL) 1000 units (25 mcg) tablet; Take 1 tablet (1,000 Units) by mouth daily    Healthcare maintenance  -     calcium carbonate-vitamin D (OS-DOUGLAS) 500-400 MG-UNIT tablet; Take 1 tablet by mouth 2 times daily       Medications Discontinued During This Encounter   Medication Reason     calcium carbonate-vitamin D 500-400 MG-UNIT TABS per tablet Reorder       Options for treatment and follow-up care were reviewed with the patient. Theresa Bowers  engaged in the decision making process and verbalized  understanding of the options discussed and agreed with the final plan.    Dontrell Still MD

## 2019-05-21 NOTE — PATIENT INSTRUCTIONS
Here is the plan from today's visit    1. Left shoulder pain, unspecified chronicity  - X-ray lt Shoulder G/E 3 vw; Future  - diclofenac (VOLTAREN) 1 % topical gel; Place 2 g onto the skin 4 times daily  Dispense: 100 g; Refill: 1  - Continue with physical therapy and do exercises as often as you are able to  - If not improving in 2-4 weeks we will get an MRI of your shoulder and have you see the specialist    2. Hypovitaminosis D  - vitamin D3 (CHOLECALCIFEROL) 1000 units (25 mcg) tablet; Take 1 tablet (1,000 Units) by mouth daily  Dispense: 30 tablet; Refill: 11    3. Healthcare maintenance  - calcium carbonate-vitamin D (OS-DOUGLAS) 500-400 MG-UNIT tablet; Take 1 tablet by mouth 2 times daily  Dispense: 180 tablet; Refill: 3      Please call or return to clinic if your symptoms don't go away.    Follow up plan  Follow up if you are not improving in the next 2-4 weeks.    Thank you for coming to [facility] today.  Lab Testing:  **If you had lab testing today and your results are reassuring or normal they will be mailed to you or sent through [employer] within 7 days.   **If the lab tests need quick action we will call you with the results.  The phone number we will call with results is # [phone] (home) . If this is not the best number please call our clinic and change the number.  Medication Refills:  If you need any refills please call your pharmacy and they will contact us.   If you need to  your refill at a new pharmacy, please contact the new pharmacy directly. The new pharmacy will help you get your medications transferred faster.   Scheduling:  If you have any concerns about today's visit or wish to schedule another appointment please call our office during normal business hours [phone] (8-5:00 M-F)  If a referral was made to a [facility] and you don't get a call from central scheduling please call [phone].  If a Mammogram was ordered for you at [facility]  call [phone] to schedule or change your appointment.  If you had an XRay/CT/Ultrasound/MRI ordered the number is [phone] to schedule or change your radiology appointment.   Medical Concerns:  If you have urgent medical concerns please call [phone] at any time of the day.    Dontrell Still MD

## 2019-05-24 ASSESSMENT — ENCOUNTER SYMPTOMS
NUMBNESS: 0
CHILLS: 0
FEVER: 0
ABDOMINAL PAIN: 0
SHORTNESS OF BREATH: 0
WEAKNESS: 0
NECK PAIN: 0
COUGH: 0
ARTHRALGIAS: 1
JOINT SWELLING: 0
HEADACHES: 0

## 2019-06-11 PROBLEM — M25.512 SHOULDER PAIN, LEFT: Status: RESOLVED | Noted: 2019-03-13 | Resolved: 2019-06-11

## 2019-06-11 NOTE — PROGRESS NOTES
Discharge Note    Progress reporting period is from initial evaluation note on  note on March 13  to Apr 30, 2019.    Theresa failed to follow up and current status is unknown.  Please see information below for last relevant information on current status.  Patient seen for 2 visits.    SUBJECTIVE  Subjective changes noted by patient:  shoulder does not feel any differnt of better since last visit, no reason for the lape in time, approximately 6 weeks in between visit . 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  Current pain level is 4/10.     Previous pain level was  3/10.   Changes in function:  Yes (See Goal flowsheet attached for changes in current functional level)  Adverse reaction to treatment or activity: None    OBJECTIVE  Changes noted in objective findings: AROM: 130 FF/ 140 ABD- less pain after CFM to lateral shoulder.  Verbal, manuak cues for scap setting and posture with ex.  Choppy in movemnt pattern but less pain with ex.      ASSESSMENT/PLAN      Updated problem list and treatment plan:   Pain - HEP  Decreased ROM/flexibility - HEP  Decreased strength - HEP  Decreased proprioception - HEP  Decreased joint mobility - HEP  STG/LTGs have been met or progress has been made towards goals:  Yes, please see goal flowsheet for most current information  Assessment of Progress: current status is unknown.    Last current status: Pt is progressing slower than anticipated(PT was not consistent with hep )   Self Management Plans:  HEP  I have re-evaluated this patient and find that the nature, scope, duration and intensity of the therapy is appropriate for the medical condition of the patient.  Theresa continues to require the following intervention to meet STG and LTG's:  HEP.    Recommendations:  Discharge with current home program.  Patient to follow up with MD as needed.    Please refer to the daily flowsheet for treatment today, total treatment time and time spent performing 1:1 timed  codes.

## 2020-08-13 ENCOUNTER — ANCILLARY PROCEDURE (OUTPATIENT)
Dept: GENERAL RADIOLOGY | Facility: CLINIC | Age: 50
End: 2020-08-13
Attending: FAMILY MEDICINE
Payer: COMMERCIAL

## 2020-08-13 ENCOUNTER — OFFICE VISIT (OUTPATIENT)
Dept: FAMILY MEDICINE | Facility: CLINIC | Age: 50
End: 2020-08-13
Payer: COMMERCIAL

## 2020-08-13 VITALS
OXYGEN SATURATION: 100 % | BODY MASS INDEX: 24.86 KG/M2 | WEIGHT: 149.2 LBS | DIASTOLIC BLOOD PRESSURE: 73 MMHG | RESPIRATION RATE: 16 BRPM | TEMPERATURE: 98 F | HEIGHT: 65 IN | SYSTOLIC BLOOD PRESSURE: 113 MMHG | HEART RATE: 66 BPM

## 2020-08-13 DIAGNOSIS — E55.9 HYPOVITAMINOSIS D: ICD-10-CM

## 2020-08-13 DIAGNOSIS — R25.1 TREMOR: ICD-10-CM

## 2020-08-13 DIAGNOSIS — R07.89 LEFT-SIDED CHEST WALL PAIN: ICD-10-CM

## 2020-08-13 DIAGNOSIS — Z00.00 HEALTHCARE MAINTENANCE: ICD-10-CM

## 2020-08-13 DIAGNOSIS — E03.8 SUBCLINICAL HYPOTHYROIDISM: Primary | ICD-10-CM

## 2020-08-13 DIAGNOSIS — Z13.9 SCREENING FOR CONDITION: ICD-10-CM

## 2020-08-13 DIAGNOSIS — M25.512 LEFT SHOULDER PAIN, UNSPECIFIED CHRONICITY: ICD-10-CM

## 2020-08-13 DIAGNOSIS — Z31.9 DESIRE FOR PREGNANCY: ICD-10-CM

## 2020-08-13 LAB
GLUCOSE CASUAL: 94 MG/DL (ref 51–200)
LDLC SERPL DIRECT ASSAY-MCNC: 116 MG/DL
T4 FREE SERPL-MCNC: 0.95 NG/DL (ref 0.76–1.46)
TSH SERPL DL<=0.005 MIU/L-ACNC: 7.94 MU/L (ref 0.4–4)

## 2020-08-13 ASSESSMENT — MIFFLIN-ST. JEOR: SCORE: 1292.65

## 2020-08-13 NOTE — PROGRESS NOTES
"       HPI       Theresa Bowers is a 50 year old (real age 44 confirmed today in clinic) who presents for left flank pain and general health maintenance. She has had left flank pain for the past year that she only notices at night when she is lying on her left side. It does not keep her from sleeping. It is not worse with exercise or with changing from sitting to standing. She doesn't remember any injury before the pain started. She has no pain or blood with urination, however she has noticed she has to urinate more often. She thinks it is only when she is drinking lots water however says some days she feels like she is going very frequently. She has not had any incontinence or felt thirstier than normal.     For her general health maintenance, she says she took her levothyroxine a few years ago but stopped when it ran out in 2019 and that after stopping she remembers having a normal thyroid level checked. She is interested in checking her lipids and in general in making sure she is healthy overall today in clinic.    ROS negative for: Cough, shortness of breath, chest pain, fevers, rash, change in bowel movements, pain other than noted in HPI, dry skin.     Problem, Medication and Allergy Lists were reviewed and updated if needed..    Patient is an established patient of this clinic..         Review of Systems:   See HPI for pertinent positives and negatives.         Physical Exam:   /73   Pulse 66   Temp 98  F (36.7  C) (Oral)   Resp 16   Ht 1.651 m (5' 5\")   Wt 67.7 kg (149 lb 3.2 oz)   LMP 08/10/2020 (Exact Date)   SpO2 100%   Breastfeeding No   BMI 24.83 kg/m    GENERAL: Healthy, alert and no distress.  EYES: Eyes grossly normal to inspection, extraocular movements intact.  HENT: Normocephalic, atraumatic, nose grossly normal.  NECK:No asymmetry, no stiffness.  RESP: Lungs clear to auscultation bilaterally.  CV: Regular rates and rhythm, normal S1 S2, no additional heart sounds " appreciated.  ABDOMEN: Nondistended, normal bowel sounds.  MS: Extremities- no gross deformities noted, no LE edema. Slight left lower flank tenderness to deep palpation, tenderness over cartilage of 9th and 10th ribs, no masses appreciated, no CVA tenderness, no bruising, no erythema. No pain on deep inspiration.  SKIN: No suspicious lesions, no visible rashes.  NEURO: Strength and tone grossly normal, mentation intact, speech normal.  PSYCH: Alert; speech is coherent, normal rate and volume; able to articulate logical thoughts.        Results:    Results from this visit  Results for orders placed or performed in visit on 08/13/20   XR RIBS & CHEST LT G/E 3VW     Status: None    Narrative    Exam: XR RIBS & CHEST LT 3VW    History: 50 years years old. Left-sided chest wall pain    COMPARISON: Chest radiograph 11/6/2012    Findings:    PA view of chest and left rib series were obtained.    No displaced rib fracture.    Cardiomediastinal silhouette is within normal limits. Lungs are clear.      Impression    IMPRESSION: No displaced rib fracture.    TANESHA LUCAS   Results for orders placed or performed in visit on 08/13/20   TSH with free T4 reflex     Status: Abnormal   Result Value Ref Range    TSH 7.94 (H) 0.40 - 4.00 mU/L   Glucose Casual ([facility])     Status: None   Result Value Ref Range    Glucose Casual 94.0 51.0 - 200.0 mg/dL   LDL cholesterol direct     Status: Abnormal   Result Value Ref Range    LDL Cholesterol Direct 116 (H) <100 mg/dL   T4 free     Status: None   Result Value Ref Range    T4 Free 0.95 0.76 - 1.46 ng/dL         ASCVD calculated from lipid panel in 2018 ([facility]):  0.8% Risk of cardiovascular event in next 10 years    Assessment and Plan     51 yo woman with    Left-sided chest wall pain  Chronic symptom not affecting patient's sleep yet only noticeable at night, no pleural signs. Exam most consistent with musculoskeletal origin of pain, possibly costochondritis vs  musculoskeletal soreness (either positional/postural or from waistband) vs less likely rib fracture. Checked an XR today to rule out rib fracture, will  patient on trial of NSAID for pain and inflammation.  - XR ribs and chest, 3 view    Subclinical hypothyroid  She has not taken Synthroid since before 2019 and her last thyroid levels were checked and normal in 2019 at [facility]. She lacks clinical symptoms of hypothyroidism (cold intolerance, dry skin, constipation), but decided to check given her stopping medications and length of time since last monitored. Her TSH is indeed elevated today in clinic, however her free T4 wis WNL.  In accordance with recommendations will recommend restart Levothyroxine, with recheck of TSH in 3 months.    TSH 7.0 to 9.9 mU/L - In view of the report of increased cardiovascular mortality in younger individuals with TSH in this range [42], we treat most patients under age 65 to 70 years with TSH 7.0 or higher. However, due to the absence of benefit of treating older patients [97,109,110] and the concern that older patients may have complications from unintended overtreatment, we consider treatment only in older patients (ie, those age >65 to 70 years) who have convincing symptoms suggestive of hypothyroidism     - TSH, free T4    Healthcare maintenance  Patient has no signs concerning for malignancy (no unexpected weight changes, change in bowel habits, etc). With her true age of 44, think it is in the patient's best interest to defer colonoscopy and additional mammography screening until she reaches the age of 50 or shows any symptoms concerning for malignancy. She does have some increased urination which she thinks happens most when she drinks lots of water and no increased thirst. She had a normal Hgb A1c in 2018 at [facility] so have a low suspicion for new onset DM, however think it is reasonable to check a random blood sugar today. Additionally, she had a lipid  panel in 2018 at [facility] which showed a slightly elevated LDL so rechecked today. It is somewhat elevated again today, however her ASCVD risk calculated from her last full lipid panel in 2018 is very low so do not feel that this merits medical intervention with a statin currently. Will discuss diet at next follow up visit.  - Blood glucose  - Lipid panel       There are no discontinued medications.    Options for treatment and follow-up care were reviewed with the patient. Theresa Bowers  engaged in the decision making process and verbalized understanding of the options discussed and agreed with the final plan.    Jesus Whitley, MS4    I was present with the medical student who participated in the service and in the documentation of this note. I have verified the history and personally performed the physical exam and medical decision making, and have verified the content of the note, which accurately reflects my assessment of the patient and the plan of care.   Carrol Cahn MD

## 2020-08-13 NOTE — TELEPHONE ENCOUNTER
"Vit D 2000 last filled 3/5/19 for #30    Vit D 1000 last filled 5/21/2019 for #30 --- unable to click \"reorder\" on medication listed in med list!     Calcium/Vit D last filled 5/21/2019 for #180    Diclofenac gel last filled 5/21/2019 for #100gm    Thank you,  Lexi King, PharmD  [facility]  [phone]        "

## 2020-08-13 NOTE — LETTER
August 18, 2020      Theresa Bowers  [address]   [place] [address]        Dear Theresa,    Thank you for getting your care at [facility]. Please see below for your test results.    Your thyroid stimulating hormone (TSH) level is elevated. While your free T4 level is within normal limits, when the TSH is higher than 7 in younger people (under 65), we advise taking Levothyroxine daily to prevent risks to the heart. I would like you to restart your daily medication and return to clinic for repeat blood test in 3 months.     Lipid Results:  Cholesterol: Desirable is less than 200, Borderline is 200-240  HDL (Good Cholesterol): Desirable is greater than 40  LDL (Bad Cholesterol): Desirable is less than 130, Borderline 130-160  Triglycerides: Desirable is less than 200,  Borderline is 200-400    Resulted Orders   TSH with free T4 reflex   Result Value Ref Range    TSH 7.94 (H) 0.40 - 4.00 mU/L   Glucose Casual ([facility])   Result Value Ref Range    Glucose Casual 94.0 51.0 - 200.0 mg/dL   LDL cholesterol direct   Result Value Ref Range    LDL Cholesterol Direct 116 (H) <100 mg/dL      Comment:      Desirable:       <100 mg/dl   T4 free   Result Value Ref Range    T4 Free 0.95 0.76 - 1.46 ng/dL       If you have any concerns about these results please call and leave a message for me or send a [place] message to the clinic.    Sincerely,    Carrol Chan MD

## 2020-08-14 RX ORDER — PRENATAL VIT/IRON FUM/FOLIC AC 27MG-0.8MG
1 TABLET ORAL DAILY
Qty: 100 TABLET | Refills: 3 | Status: SHIPPED | OUTPATIENT
Start: 2020-08-14

## 2020-08-14 RX ORDER — CHOLECALCIFEROL (VITAMIN D3) 50 MCG
50 TABLET ORAL DAILY
Qty: 100 TABLET | Refills: 3 | Status: SHIPPED | OUTPATIENT
Start: 2020-08-14

## 2020-08-18 ENCOUNTER — TELEPHONE (OUTPATIENT)
Dept: PHARMACY | Facility: PHYSICIAN GROUP | Age: 50
End: 2020-08-18

## 2020-08-18 RX ORDER — LEVOTHYROXINE SODIUM 50 UG/1
50 TABLET ORAL DAILY
Qty: 90 TABLET | Refills: 1 | Status: SHIPPED | OUTPATIENT
Start: 2020-08-18 | End: 2021-09-02

## 2020-08-18 NOTE — TELEPHONE ENCOUNTER
Calcium and vitamin D3 combination not covered by patient's insurance. Since patient has prescription for vitamin D3 2000 international unit(s) daily, would a 500 mg calcium supplement alone be able to be prescribed instead?    If appropriate, please send new prescription for Calcium 500 mg tablets - take one tablet by mouth twice daily to [facility].

## 2021-07-29 ENCOUNTER — TELEPHONE (OUTPATIENT)
Dept: FAMILY MEDICINE | Facility: CLINIC | Age: 51
End: 2021-07-29

## 2021-07-29 DIAGNOSIS — E55.9 HYPOVITAMINOSIS D: ICD-10-CM

## 2021-07-29 RX ORDER — CHOLECALCIFEROL (VITAMIN D3) 50 MCG
1 TABLET ORAL DAILY
Qty: 90 TABLET | Refills: 3 | Status: SHIPPED | OUTPATIENT
Start: 2021-07-29

## 2021-07-29 NOTE — TELEPHONE ENCOUNTER
Patient is requesting a refill on this medication and it is not covered. Patient also has a vitamin D on file with us but has never filled it. Since the combo drug is not covered can you please send a prescription for just os-cassia 500 or 600mg as well as a second prescription for vitamin d to be taking along with each other? This was requested almost a year ago and nothing had come of it.     Thanks  Talat MORENO CPhT @    [facility]  [address] [place] [address]  Phone: [phone]  Fax: [phone]

## 2021-09-02 DIAGNOSIS — E03.8 SUBCLINICAL HYPOTHYROIDISM: ICD-10-CM

## 2021-09-02 RX ORDER — LEVOTHYROXINE SODIUM 50 UG/1
50 TABLET ORAL DAILY
Qty: 90 TABLET | Refills: 0 | Status: SHIPPED | OUTPATIENT
Start: 2021-09-02

## 2021-09-02 NOTE — TELEPHONE ENCOUNTER

## 2022-11-25 ENCOUNTER — HOSPITAL ENCOUNTER (OUTPATIENT)
Dept: GENERAL RADIOLOGY | Facility: CLINIC | Age: 52
Discharge: HOME | End: 2022-11-25
Attending: INTERNAL MEDICINE

## 2022-11-25 DIAGNOSIS — R76.11 MANTOUX: POSITIVE: ICD-10-CM

## 2022-11-25 PROCEDURE — 99207 XR CHEST 1 VIEW, EMPLOYEE HEALTH: CPT | Mod: GC | Performed by: RADIOLOGY

## 2022-11-25 PROCEDURE — 999N000028 XR CHEST 1 VIEW, EMPLOYEE HEALTH
